# Patient Record
Sex: FEMALE | Race: BLACK OR AFRICAN AMERICAN | URBAN - METROPOLITAN AREA
[De-identification: names, ages, dates, MRNs, and addresses within clinical notes are randomized per-mention and may not be internally consistent; named-entity substitution may affect disease eponyms.]

---

## 2018-03-13 ENCOUNTER — OP HISTORICAL/CONVERTED ENCOUNTER (OUTPATIENT)
Dept: OTHER | Age: 23
End: 2018-03-13

## 2018-04-18 ENCOUNTER — OP HISTORICAL/CONVERTED ENCOUNTER (OUTPATIENT)
Dept: OTHER | Age: 23
End: 2018-04-18

## 2019-06-10 ENCOUNTER — ED HISTORICAL/CONVERTED ENCOUNTER (OUTPATIENT)
Dept: OTHER | Age: 24
End: 2019-06-10

## 2019-08-31 ENCOUNTER — ED HISTORICAL/CONVERTED ENCOUNTER (OUTPATIENT)
Dept: OTHER | Age: 24
End: 2019-08-31

## 2022-05-09 LAB
ANTIBODY SCREEN, EXTERNAL: NEGATIVE
CHLAMYDIA, EXTERNAL: NEGATIVE
HBSAG, EXTERNAL: NEGATIVE
HIV, EXTERNAL: NEGATIVE
N. GONORRHEA, EXTERNAL: NEGATIVE
RPR, EXTERNAL: NORMAL
RUBELLA, EXTERNAL: NORMAL
TYPE, ABO & RH, EXTERNAL: NORMAL

## 2022-11-03 LAB — GRBS, EXTERNAL: POSITIVE

## 2022-11-18 ENCOUNTER — HOSPITAL ENCOUNTER (INPATIENT)
Age: 27
LOS: 5 days | Discharge: HOME | End: 2022-11-23
Attending: OBSTETRICS & GYNECOLOGY | Admitting: OBSTETRICS & GYNECOLOGY
Payer: COMMERCIAL

## 2022-11-18 DIAGNOSIS — Z3A.37 37 WEEKS GESTATION OF PREGNANCY: Primary | ICD-10-CM

## 2022-11-18 PROCEDURE — 65270000029 HC RM PRIVATE

## 2022-11-18 PROCEDURE — 4A1HXCZ MONITORING OF PRODUCTS OF CONCEPTION, CARDIAC RATE, EXTERNAL APPROACH: ICD-10-PCS | Performed by: OBSTETRICS & GYNECOLOGY

## 2022-11-18 PROCEDURE — 3E0DXGC INTRODUCTION OF OTHER THERAPEUTIC SUBSTANCE INTO MOUTH AND PHARYNX, EXTERNAL APPROACH: ICD-10-PCS | Performed by: OBSTETRICS & GYNECOLOGY

## 2022-11-18 PROCEDURE — 74011250637 HC RX REV CODE- 250/637: Performed by: OBSTETRICS & GYNECOLOGY

## 2022-11-18 RX ORDER — VALACYCLOVIR HYDROCHLORIDE 1 G/1
TABLET, FILM COATED ORAL
COMMUNITY
End: 2022-11-23

## 2022-11-18 RX ORDER — ZOLPIDEM TARTRATE 5 MG/1
5 TABLET ORAL
Status: DISCONTINUED | OUTPATIENT
Start: 2022-11-18 | End: 2022-11-20

## 2022-11-18 RX ADMIN — Medication 25 MCG: at 20:59

## 2022-11-18 RX ADMIN — Medication 25 MCG: at 23:45

## 2022-11-19 PROBLEM — O28.3 ABNORMAL ULTRASONIC FINDING ON ANTENATAL SCREENING OF MOTHER: Status: ACTIVE | Noted: 2022-11-19

## 2022-11-19 PROBLEM — Z3A.37 37 WEEKS GESTATION OF PREGNANCY: Status: ACTIVE | Noted: 2022-11-19

## 2022-11-19 LAB
ABO + RH BLD: NORMAL
BLOOD GROUP ANTIBODIES SERPL: NORMAL
ERYTHROCYTE [DISTWIDTH] IN BLOOD BY AUTOMATED COUNT: 17 % (ref 11.5–14.5)
HCT VFR BLD AUTO: 33 % (ref 35–47)
HGB BLD-MCNC: 10.6 G/DL (ref 11.5–16)
MCH RBC QN AUTO: 26.4 PG (ref 26–34)
MCHC RBC AUTO-ENTMCNC: 32.1 G/DL (ref 30–36.5)
MCV RBC AUTO: 82.1 FL (ref 80–99)
NRBC # BLD: 0.02 K/UL (ref 0–0.01)
NRBC BLD-RTO: 0.3 PER 100 WBC
PLATELET # BLD AUTO: 279 K/UL (ref 150–400)
PMV BLD AUTO: 12.8 FL (ref 8.9–12.9)
RBC # BLD AUTO: 4.02 M/UL (ref 3.8–5.2)
SPECIMEN EXP DATE BLD: NORMAL
WBC # BLD AUTO: 7.9 K/UL (ref 3.6–11)

## 2022-11-19 PROCEDURE — 74011000258 HC RX REV CODE- 258: Performed by: OBSTETRICS & GYNECOLOGY

## 2022-11-19 PROCEDURE — 74011250637 HC RX REV CODE- 250/637: Performed by: OBSTETRICS & GYNECOLOGY

## 2022-11-19 PROCEDURE — 65270000029 HC RM PRIVATE

## 2022-11-19 PROCEDURE — 74011250636 HC RX REV CODE- 250/636: Performed by: ADVANCED PRACTICE MIDWIFE

## 2022-11-19 PROCEDURE — 77010026065 HC OXYGEN MINIMUM MEDICAL AIR: Performed by: OBSTETRICS & GYNECOLOGY

## 2022-11-19 PROCEDURE — 36415 COLL VENOUS BLD VENIPUNCTURE: CPT

## 2022-11-19 PROCEDURE — 86900 BLOOD TYPING SEROLOGIC ABO: CPT

## 2022-11-19 PROCEDURE — 59200 INSERT CERVICAL DILATOR: CPT | Performed by: OBSTETRICS & GYNECOLOGY

## 2022-11-19 PROCEDURE — 74011250636 HC RX REV CODE- 250/636: Performed by: OBSTETRICS & GYNECOLOGY

## 2022-11-19 PROCEDURE — 75410000002 HC LABOR FEE PER 1 HR: Performed by: OBSTETRICS & GYNECOLOGY

## 2022-11-19 PROCEDURE — 85027 COMPLETE CBC AUTOMATED: CPT

## 2022-11-19 RX ORDER — MAG HYDROX/ALUMINUM HYD/SIMETH 200-200-20
30 SUSPENSION, ORAL (FINAL DOSE FORM) ORAL
Status: DISCONTINUED | OUTPATIENT
Start: 2022-11-19 | End: 2022-11-20 | Stop reason: HOSPADM

## 2022-11-19 RX ORDER — CALCIUM CARBONATE 200(500)MG
400 TABLET,CHEWABLE ORAL
Status: DISCONTINUED | OUTPATIENT
Start: 2022-11-19 | End: 2022-11-20 | Stop reason: HOSPADM

## 2022-11-19 RX ORDER — LIDOCAINE HYDROCHLORIDE 10 MG/ML
20 INJECTION INFILTRATION; PERINEURAL ONCE
Status: ACTIVE | OUTPATIENT
Start: 2022-11-19 | End: 2022-11-19

## 2022-11-19 RX ORDER — ACETAMINOPHEN 325 MG/1
650 TABLET ORAL
Status: DISCONTINUED | OUTPATIENT
Start: 2022-11-19 | End: 2022-11-20 | Stop reason: HOSPADM

## 2022-11-19 RX ORDER — ONDANSETRON 2 MG/ML
4 INJECTION INTRAMUSCULAR; INTRAVENOUS
Status: DISCONTINUED | OUTPATIENT
Start: 2022-11-19 | End: 2022-11-20 | Stop reason: HOSPADM

## 2022-11-19 RX ORDER — OXYTOCIN/RINGER'S LACTATE 30/500 ML
0-42 PLASTIC BAG, INJECTION (ML) INTRAVENOUS
Status: DISCONTINUED | OUTPATIENT
Start: 2022-11-19 | End: 2022-11-20

## 2022-11-19 RX ORDER — MINERAL OIL
120 OIL (ML) ORAL ONCE
Status: ACTIVE | OUTPATIENT
Start: 2022-11-19 | End: 2022-11-19

## 2022-11-19 RX ORDER — SODIUM CHLORIDE, SODIUM LACTATE, POTASSIUM CHLORIDE, CALCIUM CHLORIDE 600; 310; 30; 20 MG/100ML; MG/100ML; MG/100ML; MG/100ML
125 INJECTION, SOLUTION INTRAVENOUS CONTINUOUS
Status: DISCONTINUED | OUTPATIENT
Start: 2022-11-19 | End: 2022-11-20

## 2022-11-19 RX ORDER — FENTANYL CITRATE 50 UG/ML
100 INJECTION, SOLUTION INTRAMUSCULAR; INTRAVENOUS ONCE
Status: COMPLETED | OUTPATIENT
Start: 2022-11-19 | End: 2022-11-19

## 2022-11-19 RX ADMIN — Medication 25 MCG: at 03:48

## 2022-11-19 RX ADMIN — SODIUM CHLORIDE 5 MILLION UNITS: 900 INJECTION INTRAVENOUS at 02:35

## 2022-11-19 RX ADMIN — SODIUM CHLORIDE 2.5 MILLION UNITS: 9 INJECTION, SOLUTION INTRAVENOUS at 19:40

## 2022-11-19 RX ADMIN — OXYTOCIN 2 MILLI-UNITS/MIN: 10 INJECTION INTRAVENOUS at 11:39

## 2022-11-19 RX ADMIN — SODIUM CHLORIDE 2.5 MILLION UNITS: 9 INJECTION, SOLUTION INTRAVENOUS at 06:21

## 2022-11-19 RX ADMIN — SODIUM CHLORIDE 2.5 MILLION UNITS: 9 INJECTION, SOLUTION INTRAVENOUS at 10:41

## 2022-11-19 RX ADMIN — SODIUM CHLORIDE 2.5 MILLION UNITS: 9 INJECTION, SOLUTION INTRAVENOUS at 15:04

## 2022-11-19 RX ADMIN — OXYTOCIN 16 MILLI-UNITS/MIN: 10 INJECTION INTRAVENOUS at 20:16

## 2022-11-19 RX ADMIN — Medication 25 MCG: at 01:53

## 2022-11-19 RX ADMIN — SODIUM CHLORIDE 2.5 MILLION UNITS: 9 INJECTION, SOLUTION INTRAVENOUS at 23:04

## 2022-11-19 RX ADMIN — FENTANYL CITRATE 100 MCG: 50 INJECTION INTRAMUSCULAR; INTRAVENOUS at 19:47

## 2022-11-19 RX ADMIN — SODIUM CHLORIDE, POTASSIUM CHLORIDE, SODIUM LACTATE AND CALCIUM CHLORIDE 125 ML/HR: 600; 310; 30; 20 INJECTION, SOLUTION INTRAVENOUS at 12:12

## 2022-11-19 NOTE — PROGRESS NOTES
Labor Progress Note  Patient seen, fetal heart rate and contraction pattern evaluated, patient examined. Feeling UC  Patient Vitals for the past 2 hrs:   Height Weight   11/19/22 0957 5' 3\" (1.6 m) 103 kg (227 lb)       Physical Exam:  Cervical Exam:  stallworth easily removed. 6/50/-2 BBOW AROM clear mod amount clear fluid  Uterine Activity: Q2-4  Fetal Heart Rate: Reactive  Baseline: 140 per minute  Variability: moderate  Accelerations: yes  Decelerations: none  S/p PO miso x4 overnight   PCN x several    Assessment/Plan:  IUP 38wks BMI 45 non reassuring fetal surveillance IOL  FWB reassuring, cat 1 tracing  IOL s/p stallworth and PO miso. AROM and start pit in approx 30min.  Epidural prn  Cont PCN  Plan/expectations reviewed, ,Abad Hasten answered    Ernestine Romero MD

## 2022-11-19 NOTE — PROGRESS NOTES
[facility] Labor Progress Note     Patient: Lucila Barber MRN: [id number]  SSN: [id number]    YOB: 1995  Age: 33 y.o. Sex: female        Subjective:   Pt seems mild uncomfortable, grimacing while sitting on the birth ball. Does not desire intervention at this time.        Objective:   Patient Vitals for the past 4 hrs:   Temp Pulse Resp BP SpO2   11/19/22 1453 98.1 °F (36.7 °C) 68 16 121/82 98 %       Cervical Exam: deferred  Membranes:   deferred  Fetal Heart Rate: Reactive  Baseline: 150 beats per minute  Variability: moderate  Accelerations: yes  Decelerations: none  Uterine contractions: regular, every 2-4 minutes      Assessment:   Intrauterine pregnancy at term  Category 1 fetal heart rate tracing         Plan:   Continue current orders/management   Will perform SVE at [place], CNM

## 2022-11-19 NOTE — PROGRESS NOTES
0000- dr. Harlan Leon in to place stallworth pat, pt tolerated procedure well.  60cc placed in balloon

## 2022-11-19 NOTE — PROGRESS NOTES
0700 bedside report given to DEBORAH Perkins RN and GERMAINE Luna Every from [facility]   7261 pt nadya every 3-7 minutes, misoprostol held  0900 spoke with dr arzola regarding pt plan of care. Given orders for pt to eat a regular diet. Will reevaluate plan of care and labor status later this morning. 1044 dr arzola at bedside.  removed stallworth bulb and AROM.

## 2022-11-19 NOTE — PROGRESS NOTES
Labor Progress Note  Patient seen, fetal heart rate and contraction pattern evaluated, patient examined. Patient is having only mild contractions. There were no vitals taken for this visit. Physical Exam:    33 y.o.  in no acute distress. Cervical Exam:   Presentation Vertex  Dilation 1 cms   Effacement 50 %   Station -2  Membranes: Intact  Uterine Activity:   Frequency: Occasional   Duration: 60 seconds   Intensity: Mild  Fetal Heart Rate:    Baseline: 140 bpm   Variability: Moderate   Accelerations: Present (15 x 15 bpm)   Decelerations: None  Category: 1    Procedure: Li balloon placement 60 mls    No results found for this or any previous visit (from the past 12 hour(s)).   Assessment/Plan:  Active Hospital Problems    Diagnosis Date Noted    Abnormal ultrasonic finding on  screening of mother 2022     Priority: 1 - One    37 weeks gestation of pregnancy 2022     Priority: 2 - Two     Abnormal ultrasonic finding on  screening of mother  Abnormal BPP   Continue misoprostol  Li balloon 60 mls placed  Fina Hutton MD  2022

## 2022-11-19 NOTE — H&P
History & Physical    Name: Rohan Dickey MRN: [id number]  SSN: [id number]    YOB: 1995  Age: 33 y.o. Sex: female        Subjective:     Estimated Date of Delivery: None noted. OB History          1    Para        Term                AB        Living             SAB        IAB        Ectopic        Molar        Multiple        Live Births                    Ms. Delon Lakhani is admitted with pregnancy at 37wks 6 ;days for induction of labor. Prenatal course was complicated by  obesity BMI 44, HSV on valtrex and BPP 4/8 today. Last EFW 66% . Please see prenatal records for details. No past medical history on file. No past surgical history on file. Social History     Occupational History    Not on file   Tobacco Use    Smoking status: Not on file    Smokeless tobacco: Not on file   Substance and Sexual Activity    Alcohol use: Not on file    Drug use: Not on file    Sexual activity: Not on file     No family history on file. Not on File  Prior to Admission medications    Not on File        Review of Systems: Pertinent items are noted in HPI. Objective:     Vitals: There were no vitals filed for this visit. Physical Exam:  General NAD  CVS: RRR no r/mg  Pulmonary: CTAB  Abdm: gravid NT  Back: EMILY CVA NT, spine NT  EMILY LE NT w/o edema  [place]: none  Membranes:  Intact  Fetal Heart Rate: Reactive  Baseline: 130 per minute  Variability: moderate  Accelerations: yes  Decelerations: none  SVE: 1/long in office today  EFW: 7 - 7 1/2  On vulvar or vaginal lesions      Prenatal Labs:   No results found for: RUBELLAEXT, GRBSEXT, HBSAGEXT, HIVEXT, RPREXT, GONNOEXT, CHLAMEXT     Assessment/Plan:     Plan: Admit for  IUP 37wks 6 days BMI 44 nonreassuring fetal testing today BPP 4/8. Plan PO miso and cook IOL. No catheters on labor floor, awaiting update if any in house. Place when available, stallworth prn, start PO miso now . Group B Strep was negative. CBC pending.  Plan/expectations /logistics reveiwed, all ques answered.      Signed By:  Tc Middleton MD     November 18, 2022

## 2022-11-20 ENCOUNTER — ANESTHESIA EVENT (OUTPATIENT)
Dept: LABOR AND DELIVERY | Age: 27
End: 2022-11-20
Payer: COMMERCIAL

## 2022-11-20 ENCOUNTER — ANESTHESIA (OUTPATIENT)
Dept: LABOR AND DELIVERY | Age: 27
End: 2022-11-20
Payer: COMMERCIAL

## 2022-11-20 PROCEDURE — 77030005513 HC CATH URETH FOL11 MDII -B

## 2022-11-20 PROCEDURE — 74011250636 HC RX REV CODE- 250/636: Performed by: OBSTETRICS & GYNECOLOGY

## 2022-11-20 PROCEDURE — 76010000391 HC C SECN FIRST 1 HR: Performed by: OBSTETRICS & GYNECOLOGY

## 2022-11-20 PROCEDURE — 74011000250 HC RX REV CODE- 250: Performed by: OBSTETRICS & GYNECOLOGY

## 2022-11-20 PROCEDURE — 74011250637 HC RX REV CODE- 250/637: Performed by: OBSTETRICS & GYNECOLOGY

## 2022-11-20 PROCEDURE — 74011000250 HC RX REV CODE- 250

## 2022-11-20 PROCEDURE — 74011000250 HC RX REV CODE- 250: Performed by: ANESTHESIOLOGY

## 2022-11-20 PROCEDURE — 74011250636 HC RX REV CODE- 250/636: Performed by: NURSE ANESTHETIST, CERTIFIED REGISTERED

## 2022-11-20 PROCEDURE — 74011250636 HC RX REV CODE- 250/636: Performed by: ANESTHESIOLOGY

## 2022-11-20 PROCEDURE — 75410000003 HC RECOV DEL/VAG/CSECN EA 0.5 HR: Performed by: OBSTETRICS & GYNECOLOGY

## 2022-11-20 PROCEDURE — 74011250637 HC RX REV CODE- 250/637: Performed by: ANESTHESIOLOGY

## 2022-11-20 PROCEDURE — 76060000078 HC EPIDURAL ANESTHESIA: Performed by: OBSTETRICS & GYNECOLOGY

## 2022-11-20 PROCEDURE — 65270000029 HC RM PRIVATE

## 2022-11-20 PROCEDURE — 74011000250 HC RX REV CODE- 250: Performed by: NURSE ANESTHETIST, CERTIFIED REGISTERED

## 2022-11-20 PROCEDURE — 74011250636 HC RX REV CODE- 250/636

## 2022-11-20 PROCEDURE — 74011000258 HC RX REV CODE- 258: Performed by: OBSTETRICS & GYNECOLOGY

## 2022-11-20 PROCEDURE — 76010000392 HC C SECN EA ADDL 0.5 HR: Performed by: OBSTETRICS & GYNECOLOGY

## 2022-11-20 PROCEDURE — 77030014125 HC TY EPDRL BBMI -B: Performed by: ANESTHESIOLOGY

## 2022-11-20 RX ORDER — LIDOCAINE HYDROCHLORIDE 10 MG/ML
INJECTION INFILTRATION; PERINEURAL AS NEEDED
Status: DISCONTINUED | OUTPATIENT
Start: 2022-11-20 | End: 2022-11-20 | Stop reason: HOSPADM

## 2022-11-20 RX ORDER — SIMETHICONE 80 MG
80 TABLET,CHEWABLE ORAL
Status: DISCONTINUED | OUTPATIENT
Start: 2022-11-20 | End: 2022-11-23 | Stop reason: HOSPADM

## 2022-11-20 RX ORDER — DOCUSATE SODIUM 100 MG/1
100 CAPSULE, LIQUID FILLED ORAL 2 TIMES DAILY
Status: DISCONTINUED | OUTPATIENT
Start: 2022-11-20 | End: 2022-11-23 | Stop reason: HOSPADM

## 2022-11-20 RX ORDER — PHENYLEPHRINE HCL IN 0.9% NACL 0.4MG/10ML
SYRINGE (ML) INTRAVENOUS AS NEEDED
Status: DISCONTINUED | OUTPATIENT
Start: 2022-11-20 | End: 2022-11-20 | Stop reason: HOSPADM

## 2022-11-20 RX ORDER — BUPIVACAINE HYDROCHLORIDE 2.5 MG/ML
INJECTION, SOLUTION EPIDURAL; INFILTRATION; INTRACAUDAL
Status: SHIPPED | OUTPATIENT
Start: 2022-11-20 | End: 2022-11-20

## 2022-11-20 RX ORDER — OXYTOCIN/RINGER'S LACTATE 30/500 ML
87.3 PLASTIC BAG, INJECTION (ML) INTRAVENOUS AS NEEDED
Status: DISCONTINUED | OUTPATIENT
Start: 2022-11-20 | End: 2022-11-23 | Stop reason: HOSPADM

## 2022-11-20 RX ORDER — OXYTOCIN/RINGER'S LACTATE 30/500 ML
10 PLASTIC BAG, INJECTION (ML) INTRAVENOUS AS NEEDED
Status: DISCONTINUED | OUTPATIENT
Start: 2022-11-20 | End: 2022-11-23 | Stop reason: HOSPADM

## 2022-11-20 RX ORDER — SODIUM CHLORIDE, SODIUM LACTATE, POTASSIUM CHLORIDE, CALCIUM CHLORIDE 600; 310; 30; 20 MG/100ML; MG/100ML; MG/100ML; MG/100ML
125 INJECTION, SOLUTION INTRAVENOUS CONTINUOUS
Status: DISPENSED | OUTPATIENT
Start: 2022-11-20 | End: 2022-11-21

## 2022-11-20 RX ORDER — KETOROLAC TROMETHAMINE 30 MG/ML
30 INJECTION, SOLUTION INTRAMUSCULAR; INTRAVENOUS
Status: ACTIVE | OUTPATIENT
Start: 2022-11-20 | End: 2022-11-21

## 2022-11-20 RX ORDER — PROPOFOL 10 MG/ML
INJECTION, EMULSION INTRAVENOUS AS NEEDED
Status: DISCONTINUED | OUTPATIENT
Start: 2022-11-20 | End: 2022-11-20 | Stop reason: HOSPADM

## 2022-11-20 RX ORDER — SODIUM CHLORIDE, SODIUM LACTATE, POTASSIUM CHLORIDE, CALCIUM CHLORIDE 600; 310; 30; 20 MG/100ML; MG/100ML; MG/100ML; MG/100ML
INJECTION, SOLUTION INTRAVENOUS
Status: DISCONTINUED | OUTPATIENT
Start: 2022-11-20 | End: 2022-11-20 | Stop reason: HOSPADM

## 2022-11-20 RX ORDER — LIDOCAINE HYDROCHLORIDE AND EPINEPHRINE 15; 5 MG/ML; UG/ML
INJECTION, SOLUTION EPIDURAL
Status: SHIPPED | OUTPATIENT
Start: 2022-11-20 | End: 2022-11-20

## 2022-11-20 RX ORDER — MORPHINE SULFATE 0.5 MG/ML
INJECTION, SOLUTION EPIDURAL; INTRATHECAL; INTRAVENOUS AS NEEDED
Status: DISCONTINUED | OUTPATIENT
Start: 2022-11-20 | End: 2022-11-20 | Stop reason: HOSPADM

## 2022-11-20 RX ORDER — FENTANYL/BUPIVACAINE/NS/PF 2-1250MCG
1-16 PREFILLED PUMP RESERVOIR EPIDURAL CONTINUOUS
Status: DISCONTINUED | OUTPATIENT
Start: 2022-11-20 | End: 2022-11-20 | Stop reason: HOSPADM

## 2022-11-20 RX ORDER — ACETAMINOPHEN 325 MG/1
650 TABLET ORAL
Status: DISCONTINUED | OUTPATIENT
Start: 2022-11-20 | End: 2022-11-23 | Stop reason: HOSPADM

## 2022-11-20 RX ORDER — EPHEDRINE SULFATE/0.9% NACL/PF 50 MG/5 ML
10 SYRINGE (ML) INTRAVENOUS ONCE
Status: COMPLETED | OUTPATIENT
Start: 2022-11-20 | End: 2022-11-20

## 2022-11-20 RX ORDER — KETOROLAC TROMETHAMINE 30 MG/ML
30 INJECTION, SOLUTION INTRAMUSCULAR; INTRAVENOUS EVERY 6 HOURS
Status: COMPLETED | OUTPATIENT
Start: 2022-11-20 | End: 2022-11-21

## 2022-11-20 RX ORDER — HYDROCODONE BITARTRATE AND ACETAMINOPHEN 5; 325 MG/1; MG/1
1 TABLET ORAL
Status: DISCONTINUED | OUTPATIENT
Start: 2022-11-20 | End: 2022-11-23 | Stop reason: HOSPADM

## 2022-11-20 RX ORDER — SODIUM CHLORIDE 0.9 % (FLUSH) 0.9 %
5-40 SYRINGE (ML) INJECTION AS NEEDED
Status: DISCONTINUED | OUTPATIENT
Start: 2022-11-20 | End: 2022-11-23

## 2022-11-20 RX ORDER — FENTANYL CITRATE 50 UG/ML
INJECTION, SOLUTION INTRAMUSCULAR; INTRAVENOUS AS NEEDED
Status: DISCONTINUED | OUTPATIENT
Start: 2022-11-20 | End: 2022-11-20 | Stop reason: HOSPADM

## 2022-11-20 RX ORDER — OXYCODONE HYDROCHLORIDE 5 MG/1
10 TABLET ORAL
Status: ACTIVE | OUTPATIENT
Start: 2022-11-20 | End: 2022-11-21

## 2022-11-20 RX ORDER — OXYCODONE HYDROCHLORIDE 5 MG/1
5 TABLET ORAL
Status: DISPENSED | OUTPATIENT
Start: 2022-11-20 | End: 2022-11-21

## 2022-11-20 RX ORDER — ONDANSETRON 2 MG/ML
INJECTION INTRAMUSCULAR; INTRAVENOUS AS NEEDED
Status: DISCONTINUED | OUTPATIENT
Start: 2022-11-20 | End: 2022-11-20 | Stop reason: HOSPADM

## 2022-11-20 RX ORDER — LIDOCAINE HYDROCHLORIDE 20 MG/ML
INJECTION, SOLUTION EPIDURAL; INFILTRATION; INTRACAUDAL; PERINEURAL AS NEEDED
Status: DISCONTINUED | OUTPATIENT
Start: 2022-11-20 | End: 2022-11-20 | Stop reason: HOSPADM

## 2022-11-20 RX ORDER — HYDROMORPHONE HYDROCHLORIDE 1 MG/ML
0.5 INJECTION, SOLUTION INTRAMUSCULAR; INTRAVENOUS; SUBCUTANEOUS
Status: ACTIVE | OUTPATIENT
Start: 2022-11-20 | End: 2022-11-21

## 2022-11-20 RX ORDER — NALBUPHINE HYDROCHLORIDE 10 MG/ML
5 INJECTION, SOLUTION INTRAMUSCULAR; INTRAVENOUS; SUBCUTANEOUS
Status: ACTIVE | OUTPATIENT
Start: 2022-11-20 | End: 2022-11-21

## 2022-11-20 RX ORDER — OXYTOCIN/RINGER'S LACTATE 30/500 ML
87.3 PLASTIC BAG, INJECTION (ML) INTRAVENOUS AS NEEDED
Status: DISCONTINUED | OUTPATIENT
Start: 2022-11-20 | End: 2022-11-20

## 2022-11-20 RX ORDER — DIPHENHYDRAMINE HCL 25 MG
25 CAPSULE ORAL
Status: DISCONTINUED | OUTPATIENT
Start: 2022-11-20 | End: 2022-11-23 | Stop reason: HOSPADM

## 2022-11-20 RX ORDER — TRANEXAMIC ACID 100 MG/ML
INJECTION, SOLUTION INTRAVENOUS AS NEEDED
Status: DISCONTINUED | OUTPATIENT
Start: 2022-11-20 | End: 2022-11-20 | Stop reason: HOSPADM

## 2022-11-20 RX ORDER — OXYTOCIN 10 [USP'U]/ML
INJECTION, SOLUTION INTRAMUSCULAR; INTRAVENOUS AS NEEDED
Status: DISCONTINUED | OUTPATIENT
Start: 2022-11-20 | End: 2022-11-20 | Stop reason: HOSPADM

## 2022-11-20 RX ORDER — SODIUM CHLORIDE 0.9 % (FLUSH) 0.9 %
5-40 SYRINGE (ML) INJECTION AS NEEDED
Status: DISCONTINUED | OUTPATIENT
Start: 2022-11-20 | End: 2022-11-20

## 2022-11-20 RX ORDER — SODIUM CHLORIDE 0.9 % (FLUSH) 0.9 %
5-40 SYRINGE (ML) INJECTION EVERY 8 HOURS
Status: DISCONTINUED | OUTPATIENT
Start: 2022-11-20 | End: 2022-11-23

## 2022-11-20 RX ORDER — LIDOCAINE HYDROCHLORIDE AND EPINEPHRINE 20; 5 MG/ML; UG/ML
INJECTION, SOLUTION EPIDURAL; INFILTRATION; INTRACAUDAL; PERINEURAL AS NEEDED
Status: DISCONTINUED | OUTPATIENT
Start: 2022-11-20 | End: 2022-11-20 | Stop reason: HOSPADM

## 2022-11-20 RX ORDER — ZOLPIDEM TARTRATE 5 MG/1
5 TABLET ORAL
Status: DISCONTINUED | OUTPATIENT
Start: 2022-11-21 | End: 2022-11-23 | Stop reason: HOSPADM

## 2022-11-20 RX ORDER — SODIUM CHLORIDE 0.9 % (FLUSH) 0.9 %
5-40 SYRINGE (ML) INJECTION EVERY 8 HOURS
Status: DISCONTINUED | OUTPATIENT
Start: 2022-11-20 | End: 2022-11-20

## 2022-11-20 RX ORDER — OXYTOCIN/RINGER'S LACTATE 30/500 ML
10 PLASTIC BAG, INJECTION (ML) INTRAVENOUS AS NEEDED
Status: DISCONTINUED | OUTPATIENT
Start: 2022-11-20 | End: 2022-11-20

## 2022-11-20 RX ORDER — NALOXONE HYDROCHLORIDE 0.4 MG/ML
0.4 INJECTION, SOLUTION INTRAMUSCULAR; INTRAVENOUS; SUBCUTANEOUS AS NEEDED
Status: DISCONTINUED | OUTPATIENT
Start: 2022-11-20 | End: 2022-11-23 | Stop reason: HOSPADM

## 2022-11-20 RX ORDER — IBUPROFEN 800 MG/1
800 TABLET ORAL EVERY 8 HOURS
Status: DISCONTINUED | OUTPATIENT
Start: 2022-11-21 | End: 2022-11-23 | Stop reason: HOSPADM

## 2022-11-20 RX ADMIN — LIDOCAINE HYDROCHLORIDE 40 MG: 20 INJECTION, SOLUTION INTRAVENOUS at 09:14

## 2022-11-20 RX ADMIN — Medication 10 MG: at 03:36

## 2022-11-20 RX ADMIN — Medication 80 MCG: at 08:41

## 2022-11-20 RX ADMIN — FENTANYL CITRATE 25 MCG: 50 INJECTION, SOLUTION INTRAMUSCULAR; INTRAVENOUS at 09:14

## 2022-11-20 RX ADMIN — Medication 40 MCG: at 08:48

## 2022-11-20 RX ADMIN — BUPIVACAINE HYDROCHLORIDE 10 ML: 2.5 INJECTION, SOLUTION EPIDURAL; INFILTRATION; INTRACAUDAL; PERINEURAL at 02:39

## 2022-11-20 RX ADMIN — SODIUM CHLORIDE, POTASSIUM CHLORIDE, SODIUM LACTATE AND CALCIUM CHLORIDE 125 ML/HR: 600; 310; 30; 20 INJECTION, SOLUTION INTRAVENOUS at 15:16

## 2022-11-20 RX ADMIN — LIDOCAINE HYDROCHLORIDE,EPINEPHRINE BITARTRATE 5 ML: 20; .005 INJECTION, SOLUTION EPIDURAL; INFILTRATION; INTRACAUDAL; PERINEURAL at 08:28

## 2022-11-20 RX ADMIN — SODIUM CHLORIDE, PRESERVATIVE FREE 10 ML: 5 INJECTION INTRAVENOUS at 22:48

## 2022-11-20 RX ADMIN — Medication 80 MCG: at 08:43

## 2022-11-20 RX ADMIN — DOCUSATE SODIUM 100 MG: 100 CAPSULE, LIQUID FILLED ORAL at 18:09

## 2022-11-20 RX ADMIN — CEFAZOLIN SODIUM 2 G: 1 POWDER, FOR SOLUTION INTRAMUSCULAR; INTRAVENOUS at 08:46

## 2022-11-20 RX ADMIN — Medication 40 MCG: at 08:40

## 2022-11-20 RX ADMIN — KETOROLAC TROMETHAMINE 30 MG: 30 INJECTION, SOLUTION INTRAMUSCULAR at 16:34

## 2022-11-20 RX ADMIN — OXYTOCIN 40 UNITS: 10 INJECTION, SOLUTION INTRAMUSCULAR; INTRAVENOUS at 09:11

## 2022-11-20 RX ADMIN — LIDOCAINE HYDROCHLORIDE,EPINEPHRINE BITARTRATE 5 ML: 20; .005 INJECTION, SOLUTION EPIDURAL; INFILTRATION; INTRACAUDAL; PERINEURAL at 08:33

## 2022-11-20 RX ADMIN — SODIUM CHLORIDE 2.5 MILLION UNITS: 9 INJECTION, SOLUTION INTRAVENOUS at 03:36

## 2022-11-20 RX ADMIN — Medication 80 MCG: at 08:42

## 2022-11-20 RX ADMIN — LIDOCAINE HYDROCHLORIDE,EPINEPHRINE BITARTRATE 5 ML: 20; .005 INJECTION, SOLUTION EPIDURAL; INFILTRATION; INTRACAUDAL; PERINEURAL at 08:41

## 2022-11-20 RX ADMIN — OXYCODONE 5 MG: 5 TABLET ORAL at 11:49

## 2022-11-20 RX ADMIN — SODIUM CHLORIDE, POTASSIUM CHLORIDE, SODIUM LACTATE AND CALCIUM CHLORIDE: 600; 310; 30; 20 INJECTION, SOLUTION INTRAVENOUS at 09:12

## 2022-11-20 RX ADMIN — SODIUM CHLORIDE, POTASSIUM CHLORIDE, SODIUM LACTATE AND CALCIUM CHLORIDE 500 ML: 600; 310; 30; 20 INJECTION, SOLUTION INTRAVENOUS at 03:31

## 2022-11-20 RX ADMIN — PROPOFOL 20 MG: 10 INJECTION, EMULSION INTRAVENOUS at 09:14

## 2022-11-20 RX ADMIN — Medication 12 ML/HR: at 03:08

## 2022-11-20 RX ADMIN — FENTANYL CITRATE 25 MCG: 50 INJECTION, SOLUTION INTRAMUSCULAR; INTRAVENOUS at 09:19

## 2022-11-20 RX ADMIN — AZITHROMYCIN DIHYDRATE 500 MG: 500 INJECTION, POWDER, LYOPHILIZED, FOR SOLUTION INTRAVENOUS at 08:46

## 2022-11-20 RX ADMIN — Medication 80 MCG: at 08:52

## 2022-11-20 RX ADMIN — PHENYLEPHRINE HYDROCHLORIDE 5 MCG/MIN: 10 INJECTION INTRAVENOUS at 08:53

## 2022-11-20 RX ADMIN — Medication 80 MCG: at 08:46

## 2022-11-20 RX ADMIN — LIDOCAINE HYDROCHLORIDE 3 MG: 10 INJECTION, SOLUTION INFILTRATION; PERINEURAL at 02:30

## 2022-11-20 RX ADMIN — MORPHINE SULFATE 3 MG: 0.5 INJECTION, SOLUTION EPIDURAL; INTRATHECAL; INTRAVENOUS at 09:17

## 2022-11-20 RX ADMIN — TRANEXAMIC ACID 1 G: 100 INJECTION, SOLUTION INTRAVENOUS at 09:11

## 2022-11-20 RX ADMIN — PROPOFOL 20 MG: 10 INJECTION, EMULSION INTRAVENOUS at 09:18

## 2022-11-20 RX ADMIN — ONDANSETRON HYDROCHLORIDE 4 MG: 2 SOLUTION INTRAMUSCULAR; INTRAVENOUS at 08:39

## 2022-11-20 RX ADMIN — KETOROLAC TROMETHAMINE 30 MG: 30 INJECTION, SOLUTION INTRAMUSCULAR at 22:47

## 2022-11-20 RX ADMIN — SODIUM CHLORIDE, POTASSIUM CHLORIDE, SODIUM LACTATE AND CALCIUM CHLORIDE 125 ML/HR: 600; 310; 30; 20 INJECTION, SOLUTION INTRAVENOUS at 10:00

## 2022-11-20 RX ADMIN — SIMETHICONE 80 MG: 80 TABLET, CHEWABLE ORAL at 11:49

## 2022-11-20 RX ADMIN — SODIUM CHLORIDE, POTASSIUM CHLORIDE, SODIUM LACTATE AND CALCIUM CHLORIDE: 600; 310; 30; 20 INJECTION, SOLUTION INTRAVENOUS at 08:56

## 2022-11-20 RX ADMIN — SODIUM CHLORIDE, POTASSIUM CHLORIDE, SODIUM LACTATE AND CALCIUM CHLORIDE 125 ML/HR: 600; 310; 30; 20 INJECTION, SOLUTION INTRAVENOUS at 23:32

## 2022-11-20 RX ADMIN — KETOROLAC TROMETHAMINE 30 MG: 30 INJECTION, SOLUTION INTRAMUSCULAR at 10:50

## 2022-11-20 RX ADMIN — LIDOCAINE HYDROCHLORIDE AND EPINEPHRINE 3.5 ML: 15; 5 INJECTION, SOLUTION EPIDURAL at 02:37

## 2022-11-20 NOTE — PROGRESS NOTES
: Bedside and Verbal shift change report given to Wiliam Lombard RN (oncoming nurse) by Geraldo Curran RN (offgoing nurse). Report included the following information SBAR, Kardex, Intake/Output, MAR, and Recent Results. : SVE by Sedrick Mistry. /-1.     : Pt up on birthing ball. : Pt back in bed.     : Pt encouraged to walk in the room, bounce on birthing ball, or make frequent position changes in bed.     : Pt on L side with peanut ball between legs. 230: Gerldine Laity from [facility] to half pitocin. 2313: SVE by [facility]. .     2315: IUPC placed at this time. 2334: Pt placed on L side with peanut ball between legs. 0033: Pt placed in thrones position. 0120: Pt placed on L side with R leg in stirrup, trendelenburg. 0150: [facility] [place]. No change. Bolusing for epidural now. 0230: Dr. Wiliam Marquez in room for epidural placement. This RN attempting to continuously monitor FHR.     0239: Test dose given. 8499: Pt placed in trendelenburg. 0448: Pt turned on L side. 5062: [facility] [place]. No change. Pt turned on R side. 2598:  discussed with pt at this time. 0700: Bedside and Verbal shift change report given to Davin Becker RN (oncoming nurse) by Wiliam Lombard RN (offgoing nurse). Report included the following information SBAR, Kardex, Intake/Output, MAR, and Recent Results.

## 2022-11-20 NOTE — DISCHARGE SUMMARY
Patient ID:  Yasmani Robbins  [id number]  89 y.o.  1995    Admit Date: 2022    Discharge Date: 2022    Admitting Physician: Sj Caballero MD    Attending Physician: Stephanie Taylor MD    Admission Diagnoses: Abnormal ultrasonic finding on  screening of mother [O28.3]    Procedures for this admission: Procedure(s):   SECTION    Discharge Diagnoses: Same as above with 1LTCS producing a viable infant. Information for the patient's :  Martha Parekh Plasgarret [[id number]]        Apgars 8 and 9    Discharge Disposition:  good    Discharge Condition:  good    Additional Diagnoses:  IUP 38wks BMI 45 nonreassuring fetal surveillance, stage  1 arrest .     Maternal Labs:   Lab Results   Component Value Date/Time    HBsAg, External Negative 2022 12:00 AM    HIV, External Negative 2022 12:00 AM    Rubella, External Immune 2022 12:00 AM    RPR, External Non-reactive 2022 12:00 AM    GrBStrep, External positive 2022 12:00 AM       Cord Blood Results:   Information for the patient's :  Martha Parekh [[id number]]   No results found for: PCTABR, PCTDIG, BILI, ABORH         History of Present Illness:   OB History          1    Para        Term                AB        Living             SAB        IAB        Ectopic        Molar        Multiple        Live Births                  Admitted for induction of labor. Hospital Course:   Patient was admitted as above and delivered via 1LTCS . Please the chart for details. The postpartum course was unremarkable. She was deemed stable for discharge home on day 3.     Follow-up Care: 1mo        Signed:  Mirella Benson MD  2022  9:50 AM

## 2022-11-20 NOTE — PROGRESS NOTES
1926 Bedside and Verbal shift change report given to [place], RN (oncoming nurse) by Roosevelt Oconnor RN (offgoing nurse). Report included the following information SBAR, Kardex, and MAR.

## 2022-11-20 NOTE — PROGRESS NOTES
Labor Progress Note late entry from 0755  Patient seen, fetal heart rate and contraction pattern evaluated, patient examined. Seen w/ CNM  Patient Vitals for the past 2 hrs:   BP Temp Pulse Resp SpO2   11/20/22 0816 (!) 95/53 -- (!) 104 -- --   11/20/22 0814 -- -- -- -- 100 %   11/20/22 0813 (!) 101/57 -- (!) 101 -- --   11/20/22 0811 100/61 -- (!) 110 -- --   11/20/22 0809 -- -- -- -- 97 %   11/20/22 0807 (!) 101/57 -- (!) 110 -- --   11/20/22 0804 (!) 106/56 -- (!) 110 -- 100 %   11/20/22 0801 110/62 -- (!) 107 -- --   11/20/22 0759 -- -- -- -- 100 %   11/20/22 0758 (!) 100/49 -- (!) 112 -- --   11/20/22 0755 108/62 -- 98 -- --   11/20/22 0754 -- -- -- -- 99 %   11/20/22 0752 (!) 111/55 -- (!) 104 -- --   11/20/22 0750 106/71 -- (!) 110 -- --   11/20/22 0749 -- -- -- -- 98 %   11/20/22 0746 (!) 104/57 -- (!) 112 -- --   11/20/22 0744 -- -- -- -- 99 %   11/20/22 0743 (!) 92/45 -- (!) 104 -- --   11/20/22 0740 114/61 -- (!) 105 -- --   11/20/22 0739 -- -- -- -- 100 %   11/20/22 0737 111/64 -- (!) 102 -- --   11/20/22 0734 102/60 -- (!) 120 -- 100 %   11/20/22 0728 113/64 -- (!) 109 -- --   11/20/22 0725 96/72 -- -- -- --   11/20/22 0724 -- -- -- -- 97 %   11/20/22 0722 110/60 -- (!) 116 -- --   11/20/22 0719 117/62 -- (!) 121 -- 99 %   11/20/22 0716 112/63 -- (!) 122 -- --   11/20/22 0711 (!) 119/56 98.2 °F (36.8 °C) (!) 105 16 100 %       Physical Exam:  Cervical Exam:  def, unchanged overnight  Uterine Activity: Q8-10  Fetal Heart Rate: Reactive  Baseline: 140 per minute  Variability: moderate  Accelerations: yes  Decelerations: none  Pit off, s/p PCN x 7    Discussed day and overnight events with CNM. Essentially no cervical change, s/p pit, IUPC and increased pit to adequate UC, s/p epidural. No change at last SVE.  Essentially no change since AROM >18 hours ago    Assessment/Plan:  IUP 38wks nonreassuring fetal surveillance IOL BMI 45  stage 1 arrest  FWB reassuring, cat 1 tracing  Gbs treated  Stage  1 arrest and recommend 1LTCS for delivery. Patient seen w/ nursing staff and CNM. IOL indications, events and care reviewed. Recommendations reviewed. PARQ held re: 1LTCS, risks/benefit reviewed, including but not limited to, bleeding, infection, damage to internal/ surrounding organs. Risk of thrombosis/ blood transfusion reviewed. All ques answered, consent obtained. Anes updated. Ancef and carmelina HATHAWAY.      Raissa Chino MD

## 2022-11-20 NOTE — PROGRESS NOTES
Pt comfortable and on her phone. I informed her that a c/s that would be the safest method of delivery at this point. R/B/A alternatives reviewed. [address] is very understanding and agrees with POC.     Gloria Ross CNM

## 2022-11-20 NOTE — PROGRESS NOTES
[facility] Labor Progress Note     Patient: Zoya Simpson MRN: [id number]  SSN: [id number]    YOB: 1995  Age: 33 y.o. Sex: female        Subjective:   Patient resting comfortably after epidural placement. Objective:   Patient Vitals for the past 4 hrs:   Temp Pulse BP SpO2   11/20/22 0358 -- 100 (!) 97/56 --   11/20/22 0355 -- (!) 110 (!) 90/48 --   11/20/22 0352 -- 98 103/60 --   11/20/22 0349 -- (!) 25 (!) 99/56 --   11/20/22 0346 98.3 °F (36.8 °C) (!) 24 106/61 --   11/20/22 0343 -- 93 111/60 --   11/20/22 0340 -- -- 119/67 --   11/20/22 0337 -- 81 (!) 101/51 --   11/20/22 0334 -- 82 (!) 95/51 99 %   11/20/22 0331 -- 81 (!) 92/59 --   11/20/22 0328 -- 78 (!) 100/56 --   11/20/22 0325 -- 91 91/63 --   11/20/22 0323 -- 92 108/61 --   11/20/22 0319 -- 73 120/70 99 %   11/20/22 0316 -- 69 120/67 --   11/20/22 0313 -- 93 106/71 --   11/20/22 0310 -- (!) 101 118/75 --   11/20/22 0307 -- (!) 105 114/71 --   11/20/22 0304 -- 94 116/70 100 %   11/20/22 0301 -- 83 117/70 --   11/20/22 0258 -- 83 122/70 --   11/20/22 0255 -- 82 125/76 --   11/20/22 0252 -- (!) 105 118/70 --   11/20/22 0249 -- 96 121/68 98 %   11/20/22 0246 -- 90 125/73 --   11/20/22 0243 -- (!) 109 125/80 --   11/20/22 0240 -- (!) 130 125/77 --   11/20/22 0237 -- (!) 128 (!) 137/92 --   11/20/22 0234 -- (!) 133 (!) 142/84 100 %   11/20/22 0231 -- (!) 122 (!) 149/92 --       Cervical Exam: deferred  Membranes:  Artificial Rupture of Membranes;  Amniotic Fluid: small amount of clear fluid  Fetal Heart Rate: Reactive  Baseline: 140 beats per minute  Variability: moderate  Accelerations: yes  Decelerations: late, repetitive  Uterine contractions: regular, every 4 minutes  MVUs: 110      Assessment:   Intrauterine pregnancy at term  Category 2 fetal heart rate tracing   Hypotensive after epidural placement  Inadequate MVUs        Plan:   ALBERTO Kauffman halved pitocin and 500cc bolus was given; pitocin now at 8 mu/min  Continue current orders/management   CNM management   Anticipate     Nicole Dudley CNM

## 2022-11-20 NOTE — ANESTHESIA POSTPROCEDURE EVALUATION
Procedure(s):   SECTION. epidural    Anesthesia Post Evaluation      Multimodal analgesia: multimodal analgesia not used between 6 hours prior to anesthesia start to PACU discharge  Patient location during evaluation: PACU  Patient participation: complete - patient participated  Level of consciousness: awake  Pain management: adequate  Airway patency: patent  Anesthetic complications: no  Cardiovascular status: acceptable, blood pressure returned to baseline and hemodynamically stable  Respiratory status: acceptable  Hydration status: acceptable  Post anesthesia nausea and vomiting:  controlled      INITIAL Post-op Vital signs:   Vitals Value Taken Time   /92 22 1018   Temp     Pulse 91 22 1018   Resp     SpO2 100 % 22 1021   Vitals shown include unvalidated device data.

## 2022-11-20 NOTE — ANESTHESIA PREPROCEDURE EVALUATION
Relevant Problems   No relevant active problems     Past Medical History:   Diagnosis Date    Herpes simplex virus (HSV) infection      Anesthesia Evaluation    Physical Exam    Airway  Mallampati: II  TM Distance: 4 - 6 cm  Neck ROM: normal range of motion   Mouth opening: Normal     Cardiovascular    Rhythm: regular           Dental  No notable dental hx       Pulmonary  Breath sounds clear to auscultation               Abdominal         Other Findings            Anesthetic Plan    ASA: 2  Anesthesia type: epidural            Anesthetic plan and risks discussed with: Patient

## 2022-11-20 NOTE — DISCHARGE INSTRUCTIONS
Discharge Instructions for  Section    Patient ID:  Chayo Garcia  [id number]  33 y.o.  1995    Take Home Medications       Continue taking your prenatal vitamins if you are breastfeeding. Follow-up care is a key part of your treatment and safety. Be sure to keep all your scheduled appointments    Activity  Avoid heavy lifting greater than 10lbs for 2 weeks after your surgery  Pelvic rest for 6 weeks, ie, nothing in your vagina for 6 weeks  (no intercourse, tampons, or douching). No tubs for 6 weeks. No driving for 2 weeks and until you are no longer taking prescription pain medications and you can put your foot on the break in a hurry    Limit climbing stairs to only when necessary the first 1-2 weeks. Hold the railing and do not carry your baby up and downstairs at first for safety   You may walk as tolerated, though be care to not over-do it. Walking will assist in overall healing, decrease constipation and bloating. Elda Gibbs feel better more quickly with some daily movement. Diet  Regular diet as tolerated    Wound care  There are several types of incision closures. If you have metal staples in place when you leave the hospital, please call your doctors office to schedule the staple removal as directed at discharge. If you have steri strips covering your incision, you may remove them as they fall off or be sure to remove them about one week after your surgery. Removing them in the shower may be easier. If you have Dermabond, or skin glue, covering your incision, it will fall off slowly in the next few weeks. You may remove it as it begins to peel off. Additional wound care  Clear or reddish drainage from your incision is normal.  It's best to leave it open to the air, but if there is drainage, you may cover the incision lightly with gauze, preferably without tape. Keep the incision clean and dry.     Numbness of the skin at or around your incision is normal and the feeling usually returns gradually. Call your doctor if you have increasing drainage from your incision, an unpleasant odor, red streaks, an increase in pain, or if it appears to open. Pain Management  Continue prescription pain medication as written by discharging physician  Over the counter medications such as Tylenol and ibuprofen (Motrin or Advil) are also ideal.  These may be taken together or in alternating doses. You may  take the maximum dose:  Motrin or Advil (generic ibuprofen), either 3 tablets every 6 hours or 4 tablets every 8 hours. Your prescription medication conntains a narcotic mixed with Tylenol,so  you should not take any extra Tylenol or acetaminophen until you have reduced your prescription pain medication. The maximum dose is Tylenol or acetominophen 1000 mg every 6 hours (equivalent to 2 Extra Strength Tylenols every 6 hours). After a few days, begin to replace the prescription medication with over the counter medications. Use the prescription medication if needed for more severe pain or at night. The prescription medication can be addictive if overused. Add heating pad or sitz baths as needed. Constipation  Constipation is normal after surgery, especially while taking prescription narcotic pain medication. Over the counter remedies including ducosate (Colace), take 1-2 capsules 1-2 times daily for soft stool as needed. You may also add/ try milk of magnesia or rectal remedies such as Dulcolax or Fleets enema. Recovery: What to Expect at Home  Fatigue is expected. Try to rest when you can and don't worry about doing housework or other tasks which can wait. The soreness in your incision will improve significantly over the first 2 weeks, but it may take 6-8 weeks before you are completely recovered. Back pain or general body aches or muscle soreness are expected and should improve with acetominophen or ibuprofen.   Leg swelling due to pregnancy and/or IV fluids given in the hospital will take about two weeks to resolve. Most women experience some form of the \"Baby Blues\" after having a baby. Feeling emotional, tearful, frustrated, anxious, sad, and irritable some of the time is normal and go away after about 2 weeks. Adequate rest and help from your family will help. Take breaks from caring for the baby. Call your doctor if your symptoms seem severe, last more than 2 weeks, or seem to be getting worse instead of better. Get help immediately if you have thoughts of wanting to hurt yourself or others! Call your doctor or seek immediate medical care if you have:  Heavy vaginal bleeding, soaking through one or more pads an hour for several hours. Foul-smelling discharge from your vagina or incision. Consistent nausea and vomiting and cannot keep fluids down. Consistent pain that does not get better after you take pain medicine.   Sudden chest pain and shortness of breath  Signs of a blood clot: pain/ swelling/ increasing redness in your lower extremeties  Signs of infection: increased pain in your abdomen or vaginal area; red streaks, warmth, or tenderness of your breasts; fever of 100.5 F or greater

## 2022-11-20 NOTE — PROGRESS NOTES
[facility] Labor Progress Note     Patient: Yaneli Mosquera MRN: [id number]  SSN: [id number]    YOB: 1995  Age: 33 y.o. Sex: female        Subjective:   Patient appears more uncomfortable with contractions, and is very tired. Agreeable to cervical check. Objective:   Patient Vitals for the past 4 hrs:   Pulse BP SpO2   22 -- -- 100 %   22 -- -- 100 %   22 -- -- 99 %   22 -- -- 100 %   22 -- -- 99 %   22 -- -- 100 %   22 -- -- 99 %   22 -- -- 99 %   22 -- -- 99 %   22 -- -- 99 %   22 -- -- 100 %   22 -- -- 100 %   22 -- -- 99 %   22 -- -- 100 %   22 -- -- 99 %   22 1753 62 124/78 --   22 -- -- 99 %   22 174 -- -- 99 %       Cervical Exam: 6 cm dilated    80% effaced    -1 station    Presenting Part: cephalic  Cervical Position: mid position  Consistency: Soft  Membranes:  Artificial Rupture of Membranes;  Amniotic Fluid: small amount of clear fluid  Fetal Heart Rate: Reactive  Baseline: 140 beats per minute  Variability: moderate  Accelerations: yes  Decelerations: none  Uterine contractions: regular, every 2 minutes      Assessment:   Intrauterine pregnancy at term  Category 1 fetal heart rate tracing         Plan:   Continue current orders/management   IV pain medicine ordered  [facility] management   Anticipate     Shanice Rodriguez CNM

## 2022-11-20 NOTE — PROGRESS NOTES
[facility] Labor Progress Note     Patient: Marlene Horn MRN: [id number]  SSN: [id number]    YOB: 1995  Age: 33 y.o.   Sex: female        Subjective:   Pt comfortable after epidural       Objective:   Patient Vitals for the past 4 hrs:   Temp Pulse BP SpO2   11/20/22 0555 -- (!) 104 (!) 107/58 --   11/20/22 0549 -- (!) 103 107/61 98 %   11/20/22 0546 -- 98 (!) 106/57 --   11/20/22 0543 -- 94 (!) 106/57 --   11/20/22 0540 -- 98 (!) 104/57 --   11/20/22 0537 -- 100 110/60 --   11/20/22 0534 -- 93 108/62 100 %   11/20/22 0531 -- 98 107/62 --   11/20/22 0428 -- (!) 101 (!) 94/52 --   11/20/22 0425 -- 98 (!) 97/57 --   11/20/22 0422 -- 100 (!) 100/54 --   11/20/22 0419 -- 97 (!) 97/54 100 %   11/20/22 0416 -- 97 (!) 99/55 --   11/20/22 0413 -- (!) 101 (!) 97/57 --   11/20/22 0410 -- 88 (!) 105/57 --   11/20/22 0407 -- 97 (!) 100/55 --   11/20/22 0404 -- 90 111/62 95 %   11/20/22 0401 -- 100 (!) 105/55 --   11/20/22 0358 -- 100 (!) 97/56 --   11/20/22 0355 -- (!) 110 (!) 90/48 --   11/20/22 0352 -- 98 103/60 --   11/20/22 0349 -- (!) 25 (!) 99/56 --   11/20/22 0346 98.3 °F (36.8 °C) (!) 24 106/61 --   11/20/22 0343 -- 93 111/60 --   11/20/22 0340 -- -- 119/67 --   11/20/22 0337 -- 81 (!) 101/51 --   11/20/22 0334 -- 82 (!) 95/51 99 %   11/20/22 0331 -- 81 (!) 92/59 --   11/20/22 0328 -- 78 (!) 100/56 --   11/20/22 0325 -- 91 91/63 --   11/20/22 0323 -- 92 108/61 --   11/20/22 0319 -- 73 120/70 99 %   11/20/22 0316 -- 69 120/67 --   11/20/22 0313 -- 93 106/71 --   11/20/22 0310 -- (!) 101 118/75 --   11/20/22 0307 -- (!) 105 114/71 --   11/20/22 0304 -- 94 116/70 100 %   11/20/22 0301 -- 83 117/70 --   11/20/22 0258 -- 83 122/70 --   11/20/22 0255 -- 82 125/76 --   11/20/22 0252 -- (!) 105 118/70 --   11/20/22 0249 -- 96 121/68 98 %   11/20/22 0246 -- 90 125/73 --   11/20/22 0243 -- (!) 109 125/80 --   11/20/22 0240 -- (!) 130 125/77 --       Cervical Exam: no change  Membranes:  Artificial Rupture of Membranes; Amniotic Fluid: small amount of clear fluid  Fetal Heart Rate: Reactive  Baseline: 150 beats per minute  Variability: moderate  Accelerations: yes  Decelerations: late, recurrent; adryan to 120  Uterine contractions: regular, every 4-5 minutes  MVUs: 90      Assessment:   Intrauterine pregnancy at term  Category 2 fetal heart rate tracing         Plan:   Discussed likelihood of delivery by c/s due to absent/minimal cervical change, and the inability to achieve adequate contractions  Plan to call Dr. Minda Goodman with report  Continue current orders/management   CNM management   Anticipate     Nicole Dudley CNM

## 2022-11-20 NOTE — L&D DELIVERY NOTE
Operative Note    Name: Rohan Dickey   Medical Record Number: [id number]   YOB: 1995  Today's Date: 2022      Pre-operative Diagnosis: IUP 38wks, BMI 45, nonreassuring fetal surveillance, stage 1 arrest    Post-operative Diagnosis: same, delivered    Procedure: same    Surgeon(s):  Niya Hubbard MD    Assist:  Dana Miranda RN    Anesthesia: epidural     QBL 610cc    Prophylactic Antibiotics: azithro and  Ancef  DVT Prophylaxis: Sequential Compression Devices         Fetal Description: saucedo     Birth Information:   Information for the patient's :  Jernigan Kar, Martha James [[id number]]   Delivery of a 2.915 kg male infant on 2022 at 9:09 AM. Apgars were 8  and 9 . Umbilical Cord: 3 Vessels     Umbilical Cord Events: None     Placenta: Manual Removal removal with Normal appearance. Amniotic Fluid Volume: Moderate     Amniotic Fluid Description:  Clear      Umbilical Cord: 3 vessels present    Placenta:  manual removal    Additional intraoperative findings: viable male, not engaged in pelvis.  Normal adnexa and uterus    Specimens: none           Complications: none    Implants: none    Stable to PACU    Ebonie Aranda MD  2022  9:55 AM

## 2022-11-20 NOTE — PROGRESS NOTES
9213 Assumed care of patient. Received awake in bed with family at bedside. Denies pain/discomforts. Feels comfortable with epidural. Denies H/A, visual disturbances, N/V, RUQ pain. Il patient draining rylie colored urine. Opportunity provided to ask questions. Call schroeder in reach. [phone]  to bedside to discuss POC, pt consents to . 8196 Arrived to OR via bed transport at this time. 0945 Returned to room at this time via stretcher. Awake and alert, denies pain. 1200 QBL 760ml. 1405 TRANSFER - OUT REPORT:    Verbal report given to [facility] Tolu RN(name) on Garry Griffiths  being transferred to MIU(unit) for routine progression of care       Report consisted of patients Situation, Background, Assessment and   Recommendations(SBAR). Information from the following report(s) SBAR, Kardex, MAR, and Recent Results was reviewed with the receiving nurse. Lines:   Peripheral IV 22 Anterior;Proximal;Right Forearm (Active)   Site Assessment Clean, dry, & intact 22 1003   Phlebitis Assessment 0 22 1003   Infiltration Assessment 0 22 1003   Dressing Status Clean, dry, & intact 22 1003   Dressing Type Transparent;Tape 22 1003   Hub Color/Line Status Pink;Patent; Infusing 22 1003   Action Taken Open ports on tubing capped 22 1003   Alcohol Cap Used Yes 22 0316        Opportunity for questions and clarification was provided.       Patient transported with:   Registered Nurse

## 2022-11-20 NOTE — PROGRESS NOTES
[facility] Labor Progress Note     Patient: Alexis Romero MRN: [id number]  SSN: [id number]    YOB: 1995  Age: 33 y.o. Sex: female        Subjective:   Pt extremely uncomfortable, agreeable to epidural at this time. Objective:   Patient Vitals for the past 4 hrs:  22 2348 98.4 °F (36.9 °C) -- -- --   22 2253 -- 68 135/81 --             Cervical Exam: No change from previous exam  Membranes:  Artificial Rupture of Membranes;  Amniotic Fluid: small amount of clear fluid  Fetal Heart Rate: Reactive  Baseline: 135 beats per minute  Variability: moderate  Accelerations: yes  Decelerations: none  Uterine contractions: regular, every 3-4 minutes  MVUs; 205 at 0105      Assessment:   Intrauterine pregnancy at term  Category 1 fetal heart rate tracing         Plan:   Adequate MVUs achieved  Continue current orders/management   Anesthesia notified, patient to get epidural  CNM management   Anticipate     Victor Hugo Ballard CNM

## 2022-11-20 NOTE — ANESTHESIA PROCEDURE NOTES
Epidural Block    Patient location during procedure: OB  Start time: 11/20/2022 2:30 AM  End time: 11/20/2022 2:42 AM  Reason for block: labor epidural  Staffing  Performed: attending   Anesthesiologist: Tia Manzo MD  Preanesthetic Checklist  Completed: patient identified, IV checked, site marked, risks and benefits discussed, surgical consent, monitors and equipment checked, pre-op evaluation, timeout performed and fire risk safety assessment completed and verbalized  Block Placement  Patient position: sitting  Prep: alcohol swabs, Betadine and DuraPrep  Sterility prep: drape, gloves and mask  Sedation level: no sedation  Approach: midline  Location: lumbar  Lumbar location: L2-L3  Epidural  Loss of resistance technique: hanging drop and saline  Guidance: landmark technique  Needle  Needle type: Tuohy   Needle gauge: 17 G  Needle length: 10 cm  Needle insertion depth: 9 cm  Catheter type: end hole  Catheter size: 20 G  Catheter at skin depth: 14 cm  Catheter securement method: surgical tape  Test dose: negative  Medications Administered  lidocaine-EPINEPHrine (XYLOCAINE) 1.5 %-1:200,000 Epidural - Epidural   3.5 mL - 11/20/2022 2:37:00 AM  bupivacaine (PF) (MARCAINE) 0.25% Epidural - Epidural   10 mL - 11/20/2022 2:39:00 AM  Assessment  Number of attempts: 2  Procedure assessment: patient tolerated procedure well with no immediate complications  Additional Notes  Bony landmarks hardly palpable; aseptic, atraumatic technique

## 2022-11-20 NOTE — PROGRESS NOTES
[facility] Labor Progress Note     Patient: Vertis Oppenheim MRN: [id number]  SSN: [id number]    YOB: 1995  Age: 33 y.o. Sex: female        Subjective:   Pt very uncomfortable reporting significant rectal pressure. Objective:   Patient Vitals for the past 4 hrs:  22 2348 98.4 °F (36.9 °C) -- -- --   22 2253 -- 68 135/81 --   22 2206 98.1 °F (36.7 °C) -- -- --            Cervical Exam: /-1  Membranes:  Artificial Rupture of Membranes;  Amniotic Fluid: small amount of clear fluid  Fetal Heart Rate: Reactive  Baseline: 140 beats per minute  Variability: moderate  Accelerations: yes  Decelerations: none  Uterine contractions: regular, every 4-5 minutes, but appear visually inadequate      Assessment:   Intrauterine pregnancy at term  Category 1 fetal heart rate tracing         Plan:   Pitocin halved and new bag hung  IUPC placed to better assess adequacy of uterine contractions, R/B/A explained to patient  Suggested epidural placement to possibly aid in cervical relaxation and dilation, will consider  Continue current orders/management   CNM management   Anticipate HEMAL Barker CNM

## 2022-11-20 NOTE — LACTATION NOTE
This note was copied from a baby's chart. This is mother's first baby. Mother states baby has been latching on well. [facility] taught mother how to hand express 10-20 drops if baby does not breastfeed. Skin to skin encouraged. Discussed with mother her plan for feeding. Reviewed the benefits of exclusive breast milk feeding during the hospital stay. Informed her of the risks of using formula to supplement in the first few days of life as well as the benefits of successful breast milk feeding; referred her to the Breastfeeding booklet about this information. She acknowledges understanding of information reviewed and states that it is her plan to breastfeed her infant. Will support her choice and offer additional information as needed. Encouraged mom to attempt feeding with baby led feeding cues. Just as sucking on fingers, rooting, mouthing. Looking for 8-12 feedings in 24 hours. Don't limit baby at breast, allow baby to come of breast on it's own. Baby may want to feed  often and may increase number of feedings on second day of life. Skin to skin encouraged. If baby doesn't nurse,  Mom should  hand express  10-20 drops of colostrum and drip into baby's mouth, or give to baby by finger feeding, cup feeding, or spoon feeding at least every 2-3 hours. Mother will successfully establish breastfeeding by feeding in response to early feeding cues   or wake every 3h, will obtain deep latch, and will keep log of feedings/output. Taught to BF at hunger cues and or q 2-3 hrs and to offer 10-20 drops of hand expressed colostrum at any non-feeds.       Breast Assessment  Left Breast: Medium, Large  Left Nipple: Everted, Intact  Right Breast: Medium, Large  Right Nipple: Everted, Intact  Breast- Feeding Assessment  Attends Breast-Feeding Classes: No  Breast-Feeding Experience: No  Breast Trauma/Surgery: No  Type/Quality: Good (Per mother)

## 2022-11-21 LAB
ERYTHROCYTE [DISTWIDTH] IN BLOOD BY AUTOMATED COUNT: 17.2 % (ref 11.5–14.5)
HCT VFR BLD AUTO: 29.9 % (ref 35–47)
HGB BLD-MCNC: 9.6 G/DL (ref 11.5–16)
MCH RBC QN AUTO: 26.7 PG (ref 26–34)
MCHC RBC AUTO-ENTMCNC: 32.1 G/DL (ref 30–36.5)
MCV RBC AUTO: 83.3 FL (ref 80–99)
NRBC # BLD: 0 K/UL (ref 0–0.01)
NRBC BLD-RTO: 0 PER 100 WBC
PLATELET # BLD AUTO: 193 K/UL (ref 150–400)
PMV BLD AUTO: 9.7 FL (ref 8.9–12.9)
RBC # BLD AUTO: 3.59 M/UL (ref 3.8–5.2)
WBC # BLD AUTO: 13.1 K/UL (ref 3.6–11)

## 2022-11-21 PROCEDURE — 74011250636 HC RX REV CODE- 250/636: Performed by: OBSTETRICS & GYNECOLOGY

## 2022-11-21 PROCEDURE — 36415 COLL VENOUS BLD VENIPUNCTURE: CPT

## 2022-11-21 PROCEDURE — 85027 COMPLETE CBC AUTOMATED: CPT

## 2022-11-21 PROCEDURE — 74011250637 HC RX REV CODE- 250/637: Performed by: OBSTETRICS & GYNECOLOGY

## 2022-11-21 PROCEDURE — 65270000029 HC RM PRIVATE

## 2022-11-21 RX ADMIN — IBUPROFEN 800 MG: 800 TABLET, FILM COATED ORAL at 11:58

## 2022-11-21 RX ADMIN — DOCUSATE SODIUM 100 MG: 100 CAPSULE, LIQUID FILLED ORAL at 20:26

## 2022-11-21 RX ADMIN — KETOROLAC TROMETHAMINE 30 MG: 30 INJECTION, SOLUTION INTRAMUSCULAR at 05:13

## 2022-11-21 RX ADMIN — SIMETHICONE 80 MG: 80 TABLET, CHEWABLE ORAL at 09:40

## 2022-11-21 RX ADMIN — DOCUSATE SODIUM 100 MG: 100 CAPSULE, LIQUID FILLED ORAL at 09:40

## 2022-11-21 RX ADMIN — IBUPROFEN 800 MG: 800 TABLET, FILM COATED ORAL at 20:26

## 2022-11-21 NOTE — LACTATION NOTE
This note was copied from a baby's chart. Infant nursing well per mother. This is her first baby to breast feed and she will be here another day. Infant sleepy at the moment, hand expression taught and encouraged with feedings but especially with non feedings. Feeding on demand discussed. Patient to finger feed colostrum to infant at this time. Breastfeeding booklet provided. Patient to call for further lactation help if needed. Pt will successfully establish breastfeeding by feeding in response to early feeding cues   or wake every 3h, will obtain deep latch, and will keep log of feedings/output. Taught to BF at hunger cues and or q 2-3 hrs and to offer 10-20 drops of hand expressed colostrum at any non-feeds. Breast Assessment  Left Breast: Medium, Large  Left Nipple: Everted, Intact  Right Breast: Medium, Large  Right Nipple: Everted, Intact  Breast- Feeding Assessment  Attends Breast-Feeding Classes: No  Breast-Feeding Experience: No  Breast Trauma/Surgery: No  Type/Quality: Good (per mother)  Lactation Consultant Visits  Breast-Feedings: Good  (infant sleepy)  Mother/Infant Observation  Infant Observation: Frenulum checked  LATCH Documentation  Latch: Too sleepy or reluctant, no latch achieved  Audible Swallowing: None  Type of Nipple: Everted (after stimulation)  Comfort (Breast/Nipple): Soft/non-tender  Hold (Positioning): Full assist, teach one side, mother does other, staff holds  LATCH Score: 5    Reviewed breastfeeding basics:  How milk is made and normal  breastfeeding behaviors discussed. Supply and demand,  stomach size, early feeding cues, skin to skin bonding with comfortable positioning and baby led latch-on reviewed. How to identify signs of successful breastfeeding sessions reviewed; education on asymetrical latch, signs of effective latching vs shallow, in-effective latching, normal  feeding frequency and duration and expected infant output discussed.   Normal course of breastfeeding discussed including the AAP's recommendation that children receive exclusive breast milk feedings for the first six months of life with breast milk feedings to continue through the first year of life and/or beyond as complimentary table foods are added. Breastfeeding Booklet and Warm line information provided with discussion. Discussed typical  weight loss and the importance of pediatrician appointment within 24-48 hours of discharge, at 2 weeks of life and normalcy of requesting pediatric weight checks as needed in between visits. Hand Expression Education:  Mom taught how to manually hand express her colostrum. Emphasized the importance of providing infant with valuable colostrum as infant rests skin to skin at breast.  Aware to avoid extended periods of non-feeding. Aware to offer 10-20+ drops of colostrum every 2-3 hours until infant is latching and nursing effectively. Taught the rationale behind this low tech but highly effective evidence based practice.

## 2022-11-21 NOTE — PROGRESS NOTES
PostPartum Note    Alexis Romero  [id number]  1995  33 y.o.    S:  Ms. Alexis Romero is a 33 y.o.  POD #1 s/p LTCS @ 38w1d. Doing well. She had a baby boy. Her lochia is like a period. She describes her pain as mild and is well controlled with PO medications. She is breast feeding and this is going well. She is ambulating and voiding. Tolerating PO intake. O:   Visit Vitals  BP (!) 92/53   Pulse 87   Temp 98.1 °F (36.7 °C)   Resp 16   Ht 5' 3\" (1.6 m)   Wt 103 kg (227 lb)   SpO2 97%   Breastfeeding Unknown   BMI 40.21 kg/m²       Lab Results   Component Value Date/Time    WBC 13.1 (H) 2022 03:55 AM    HGB 9.6 (L) 2022 03:55 AM    HCT 29.9 (L) 2022 03:55 AM    PLATELET 101  03:55 AM    MCV 83.3 2022 03:55 AM       Gen - No acute distress  Abdomen - Fundus firm, below the umbilicus, incision clean/dry/intact  Ext - Warm, well perfused. Nontender    A/P:  POD #1 s/p LTCS @ 38w1d doing well. 1.  Routine PP instructions/ care discussed  2. Blood type - Rh positive  3..  Circumcision per nursing to be deferred until tomorrow   4. Discharge POD2 or 3   5. F/U 4-6 weeks for PP check.       Estiven Monroy MD  [facility]

## 2022-11-21 NOTE — ROUTINE PROCESS
Bedside and Verbal shift change report given to Enma Ford RN  (oncoming nurse) by Viola Brown RN  (offgoing nurse). Report included the following information SBAR, Kardex, Intake/Output, MAR, and Recent Results.

## 2022-11-21 NOTE — OP NOTES
[facility]  OPERATIVE REPORT    Name:  Hipolito Holden  MR#:  [id number]  :  1995  ACCOUNT #:  [de-identified]  DATE OF SERVICE:  2022    PREOPERATIVE DIAGNOSES:  1. Intrauterine pregnancy at 38 weeks. 2.  BMI 45.  3.  Non-reassuring fetal surveillance. 4.  Stage I arrest.    POSTOPERATIVE DIAGNOSES:  1. Intrauterine pregnancy at 38 weeks. 2.  BMI 45.  3.  Non-reassuring fetal surveillance. 4.  Stage I arrest, delivered. PROCEDURE PERFORMED:  Primary low transverse  section. SURGEON:  Jenna Zhao MD    ASSISTANT:  Helena Driver RN    ANESTHESIA:  Epidural.    COMPLICATIONS:  None. SPECIMENS REMOVED:  None. IMPLANTS:  None. QUANTITATIVE BLOOD LOSS:  610 mL. IV FLUIDS:  1200 mL, crystalloid. URINE OUTPUT:  100 mL, clear. DRAINS:  Li to gravity. INTRAOPERATIVE FINDINGS:  Viable male infant with Apgars of 8 and 9, weight 2915 grams. Normal ovary, uterus and tubes. INDICATIONS:  Ms. Capo Akins is a 55-year-old G1, P0 with an intrauterine pregnancy at 45 weeks, who was admitted to Labor and Delivery after induction of labor given nonreassuring fetal surveillance in the office with a BPP of 4/8. Surveillance was performed due to her BMI. She presented to Labor and Delivery and underwent a balloon and prostaglandin induction of labor followed by Pitocin and amniotomy. She received an epidural for pain and seven doses of penicillin prior to delivery for GBS positive status. Her cervix remained unchanged after 18 hours following amniotomy and the decision was made to proceed with  delivery. PROCEDURE:  The patient was taken to the operating room where her epidural anesthesia was dosed to a surgical level. She was prepped and draped in normal sterile fashion in dorsal supine position with a leftward tilt. .  A Pfannenstiel skin incision was made two fingerbreadths above the pubic symphysis and carried down to the underlying layer of fascia. The fascia was incised at the midline and the incision was extended bilaterally. The superior aspect of fascial incision was grasped with Kocher clamps and elevated and the underlying rectus muscles were dissected off both bluntly and sharply. The same was done to the inferior aspect of fascial incision. The rectus muscles were  in the midline and the abdominal cavity was entered sharply with excellent visualization of underlying structures in the bowel, bladder and uterus. The incision was extended with gentle stretching and the Metzenbaum scissors. A bladder blade was inserted and the vesicouterine peritoneum was identified, entered sharply and a bladder flap was created manually. The bladder blade was readjusted. A low transverse incision was made on the uterus with clear fluid at amniotomy. A viable male infant was delivered from vertex presentation without difficulty. His cord was clamped and cut and he was handed off to the waiting pediatric team after a 30-second cord clamp delay. The placenta was delivered with manual assistance and the uterus exteriorized and cleared of clots and debris. The uterine incision repaired with 0 Vicryl in a running locked fashion. Two additional figure-of-eight sutures were placed at the mid and left hand side to ensure hemostasis. The uterus was returned to the abdomen and the gutters were cleared of clots and debris. The hysterotomy was reinspected and noted to be hemostatic. The peritoneum and rectus muscles were reapproximated in a mass fashion with 2-0 Vicryl and in a running fashion. The superior and inferior fascial planes were inspected and noted to be hemostatic. The fascia was reapproximated with 0 Vicryl in a running fashion. The subcutaneous tissue was irrigated, noted to be hemostatic and reapproximated with 3-0 Vicryl in a running fashion.   The skin was closed with 4-0 Monocryl in a subcuticular fashion and Steri-Strips were placed. The patient tolerated the procedure well. All sponge, lap and needle counts were correct x2 and she was taken to recovery room in stable condition. She received Ancef and azithromycin IV prior to incision and SCDs upon entry into the operating room.       Deya Ayon MD      [id number]  D:  11/20/2022 10:01  T:  11/20/2022 18:22  JOB #:  [id number]

## 2022-11-22 PROCEDURE — 74011250637 HC RX REV CODE- 250/637: Performed by: OBSTETRICS & GYNECOLOGY

## 2022-11-22 PROCEDURE — 65270000029 HC RM PRIVATE

## 2022-11-22 PROCEDURE — 2709999900 HC NON-CHARGEABLE SUPPLY

## 2022-11-22 RX ADMIN — IBUPROFEN 800 MG: 800 TABLET, FILM COATED ORAL at 17:31

## 2022-11-22 RX ADMIN — IBUPROFEN 800 MG: 800 TABLET, FILM COATED ORAL at 07:25

## 2022-11-22 RX ADMIN — DOCUSATE SODIUM 100 MG: 100 CAPSULE, LIQUID FILLED ORAL at 09:41

## 2022-11-22 NOTE — LACTATION NOTE
This note was copied from a baby's chart. Mother had several visitors in room at time of visit -  baby was getting a diaper change (bowel movement). Mother states baby has been latching on and breastfeeding well. Reviewed breastfeeding basics:  Supply and demand,  stomach size, early  Feeding cues, skin to skin, positioning and baby led latch-on, assymetrical latch with signs of good, deep latch vs shallow, feeding frequency and duration, and log sheet for tracking infant feedings and output. Breastfeeding Booklet and Warm line information given. Discussed typical  weight loss and the importance of infant weight checks with pediatrician 1-2 post discharge. Care for sore/tender nipples discussed:  ways to improve positioning and latch practiced and discussed, hand express colostrum after feedings and let air dry, light application of lanolin, hydrogel pads, seek comfortable laid back feeding position, start feedings on least sore side first.     Mother will successfully establish breastfeeding by feeding in response to early feeding cues   or wake every 3h, will obtain deep latch, and will keep log of feedings/output. Taught to BF at hunger cues and or q 2-3 hrs and to offer 10-20 drops of hand expressed colostrum at any non-feeds. Breast Assessment  Left Breast: Medium, Large  Left Nipple: Everted, Intact  Right Breast: Medium, Large  Right Nipple: Everted, Intact  Breast- Feeding Assessment  Attends Breast-Feeding Classes: No  Breast-Feeding Experience: No  Breast Trauma/Surgery: No  Type/Quality: Good (Per mother)  Lactation Consultant Visits  Breast-Feedings:  (Mother states baby last breast fed at 12:00 and nursed well for 10 minutes.)    LC encouraged mother to call for breastfeeding assistance.

## 2022-11-22 NOTE — PROGRESS NOTES
1900- Bedside shift change report given to GERMAINE Carvalho RN (oncoming nurse) by Fina White RNC (offgoing nurse). Report included the following information SBAR, Intake/Output, MAR, and Recent Results.

## 2022-11-22 NOTE — PROGRESS NOTES
1900- Bedside shift change report given to MADELYN Harris RN (oncoming nurse) by Vargas Allen RNC (offgoing nurse). Report included the following information SBAR, Intake/Output, MAR, and Recent Results.

## 2022-11-22 NOTE — PROGRESS NOTES
Post-Operative  Day 2    Kathleen Griffiths         Information for the patient's :  Vasquez Lima, Male Woody Ann [[id number]]   , Low Transverse  Patient doing well without unusual complaints. Patient notes  good relief  with current pain medications. Patient reports normal lochia. She is currently tolerating general diet without nausea or vomiting. She reports flatus yes. Vitals:  Visit Vitals  /62   Pulse 66   Temp 98.1 °F (36.7 °C)   Resp 16   Ht 5' 3\" (1.6 m)   Wt 103 kg (227 lb)   SpO2 97%   Breastfeeding Unknown   BMI 40.21 kg/m²     Temp (24hrs), Av.6 °F (37 °C), Min:98.1 °F (36.7 °C), Max:99.3 °F (37.4 °C)      Last 24hr Input/Output:    Intake/Output Summary (Last 24 hours) at 2022 0835  Last data filed at 2022 1200  Gross per 24 hour   Intake --   Output 700 ml   Net -700 ml          Exam:   Gen: Patient without distress. CV: RRR  Chest: CTA  Abdomen:soft, expected mild tenderness, fundus firm @U-2, incision closed with suture  Lower extremities are no tenderness with absent   edema. Labs:   Lab Results   Component Value Date/Time    WBC 13.1 (H) 2022 03:55 AM    WBC 7.9 2022 01:10 AM    HGB 9.6 (L) 2022 03:55 AM    HGB 10.6 (L) 2022 01:10 AM    HCT 29.9 (L) 2022 03:55 AM    HCT 33.0 (L) 2022 01:10 AM    PLATELET 894  03:55 AM    PLATELET 615 3514 01:10 AM       No results found for this or any previous visit (from the past 24 hour(s)).   Lab Results   Component Value Date/Time    Rubella, External Immune 2022 12:00 AM    GrBStrep, External positive 2022 12:00 AM    HBsAg, External Negative 2022 12:00 AM    HIV, External Negative 2022 12:00 AM    RPR, External Non-reactive 2022 12:00 AM    Gonorrhea, External Negative 2022 12:00 AM    Chlamydia, External Negative 2022 12:00 AM         Assessment:   POD 2 s/p 1LTCS for non-reassuring fetal surveillance, failure to progress. Doing well and stable  Plan:  1. Continue routine post operative care. 2. Advance diet as tolerated, ambulate, incentive spirometry  3. Medical conditions: Obesity BMI 45   4.  Baby boy: circ deferred this AM because baby is to have [address] for undescended teste     Monse Riddles, DO  11/22/2022  8:35 AM

## 2022-11-23 VITALS
WEIGHT: 227 LBS | HEART RATE: 70 BPM | OXYGEN SATURATION: 98 % | HEIGHT: 63 IN | TEMPERATURE: 98.1 F | RESPIRATION RATE: 16 BRPM | SYSTOLIC BLOOD PRESSURE: 132 MMHG | BODY MASS INDEX: 40.22 KG/M2 | DIASTOLIC BLOOD PRESSURE: 87 MMHG

## 2022-11-23 PROCEDURE — 74011250637 HC RX REV CODE- 250/637: Performed by: OBSTETRICS & GYNECOLOGY

## 2022-11-23 RX ORDER — LANOLIN ALCOHOL/MO/W.PET/CERES
325 CREAM (GRAM) TOPICAL
Qty: 30 TABLET | Refills: 0 | Status: SHIPPED | OUTPATIENT
Start: 2022-11-23

## 2022-11-23 RX ORDER — HYDROCODONE BITARTRATE AND ACETAMINOPHEN 5; 325 MG/1; MG/1
1 TABLET ORAL
Qty: 12 TABLET | Refills: 0 | Status: SHIPPED | OUTPATIENT
Start: 2022-11-23 | End: 2022-11-26

## 2022-11-23 RX ORDER — LANOLIN ALCOHOL/MO/W.PET/CERES
1 CREAM (GRAM) TOPICAL
Status: DISCONTINUED | OUTPATIENT
Start: 2022-11-23 | End: 2022-11-23 | Stop reason: HOSPADM

## 2022-11-23 RX ORDER — DOCUSATE SODIUM 100 MG/1
100 CAPSULE, LIQUID FILLED ORAL 2 TIMES DAILY
Qty: 60 CAPSULE | Refills: 2 | Status: SHIPPED | OUTPATIENT
Start: 2022-11-23 | End: 2023-02-21

## 2022-11-23 RX ORDER — IBUPROFEN 800 MG/1
800 TABLET ORAL
Qty: 30 TABLET | Refills: 0 | Status: SHIPPED | OUTPATIENT
Start: 2022-11-23

## 2022-11-23 RX ORDER — ACETAMINOPHEN 325 MG/1
650 TABLET ORAL
Qty: 30 TABLET | Refills: 0 | Status: SHIPPED | OUTPATIENT
Start: 2022-11-23

## 2022-11-23 RX ADMIN — IBUPROFEN 800 MG: 800 TABLET, FILM COATED ORAL at 08:36

## 2022-11-23 RX ADMIN — IBUPROFEN 800 MG: 800 TABLET, FILM COATED ORAL at 01:31

## 2022-11-23 RX ADMIN — MUPIROCIN: 20 OINTMENT TOPICAL at 08:37

## 2022-11-23 RX ADMIN — DOCUSATE SODIUM 100 MG: 100 CAPSULE, LIQUID FILLED ORAL at 08:36

## 2022-11-23 NOTE — PROGRESS NOTES
PostPartum Note    Lyndsey Coy  [id number]  1995  33 y.o.    S:  Ms. Lyndsey Coy is a 33 y.o.  POD #3 s/p primary  @ 38w1d. Doing well. She had a baby boy. Her lochia is like a period. She describes her pain as mild and is well controlled with PO medications. She is breast feeding and this is going well. She is ambulating and voiding. Tolerating PO intake. O:   Visit Vitals  /87 (BP Patient Position: At rest)   Pulse 70   Temp 98.1 °F (36.7 °C)   Resp 16   Ht 5' 3\" (1.6 m)   Wt 103 kg (227 lb)   SpO2 98%   Breastfeeding Unknown   BMI 40.21 kg/m²       Gen - No acute distress  Respirations - nonlabored   Abdomen - Fundus firm below the umbilicus   Ext - Warm, well perfused, nontender     Lab Results   Component Value Date/Time    WBC 13.1 (H) 2022 03:55 AM    HGB 9.6 (L) 2022 03:55 AM    HCT 29.9 (L) 2022 03:55 AM    PLATELET 226  03:55 AM    MCV 83.3 2022 03:55 AM         A/P:  POD #3 s/p primary  for NRFHT, FTP @ 38w1d doing well. 1.  Routine PP instructions/ care discussed  2. Blood type - Rh pos  3. Rubella immune  4. Circumcision desired will do today   5. Discharge today    6. Uncomplicated other than obese bmi 45, discussed wound care   6. F/U 4-6 weeks for PP check.       Reyna Veliz MD  [facility]

## 2022-11-23 NOTE — PROGRESS NOTES
10:35 AM Patient discharged to home. Discharge instructions and education completed and patient reported she had no more questions. Bands verified on patient and infant, see footprint sheet. Infant placed in car seat by parent. Prescriptions:   Sent to patient's preferred pharmacy.

## (undated) DEVICE — BLADE ASSEMB CLP HAIR FINE --

## (undated) DEVICE — STRIP,CLOSURE,WOUND,MEDI-STRIP,1/2X4: Brand: MEDLINE

## (undated) DEVICE — TOWEL,OR,DSP,ST,BLUE,STD,2/PK,40PK/CS: Brand: MEDLINE

## (undated) DEVICE — 3M™ MEDIPORE™ H SOFT CLOTH SURGICAL TAPE, 2863, 3 IN X 10 YD, 12/CASE: Brand: 3M™ MEDIPORE™

## (undated) DEVICE — BARRIER TISS ADH ABSRB 3X4IN -- GYNECARE INTERCEED

## (undated) DEVICE — 3000CC GUARDIAN II: Brand: GUARDIAN

## (undated) DEVICE — SUTURE VCRL 3-0 L36IN ABSRB UD CT L40MM 1/2 CIR TAPERPOINT J956H

## (undated) DEVICE — SUT VCRL 2-0 36IN CT1 UD --

## (undated) DEVICE — ROCKER SWITCH PENCIL HOLSTER: Brand: VALLEYLAB

## (undated) DEVICE — SOLUTION IV 1000ML 0.9% SOD CHL

## (undated) DEVICE — SUTURE VCRL SZ 0 L36IN ABSRB VLT L40MM CT 1/2 CIR J358H

## (undated) DEVICE — SPONGE: LAP 18X18 W  200/CS: Brand: MEDICAL ACTION INDUSTRIES

## (undated) DEVICE — TRAY,URINE METER,100% SILICONE,16FR10ML: Brand: MEDLINE

## (undated) DEVICE — C-SECTION II-LF: Brand: MEDLINE INDUSTRIES, INC.

## (undated) DEVICE — SUTURE MCRYL SZ 4 0 L18IN ABSRB VLT PS 1 L24MM 3 8 CIR REV Y682H

## (undated) DEVICE — REM POLYHESIVE ADULT PATIENT RETURN ELECTRODE: Brand: VALLEYLAB

## (undated) DEVICE — GLOVE SURG SZ 65 THK91MIL LTX FREE SYN POLYISOPRENE

## (undated) DEVICE — TIP CLEANER: Brand: VALLEYLAB

## (undated) DEVICE — (D)PREP SKN CHLRAPRP APPL 26ML -- CONVERT TO ITEM 371833

## (undated) DEVICE — SYRINGE IRRIG 60ML SFT PLIABLE BLB EZ TO GRP 1 HND USE W/

## (undated) DEVICE — GARMENT,MEDLINE,DVT,INT,CALF,MED, GEN2: Brand: MEDLINE

## (undated) DEVICE — SOLIDIFIER FLUID 3000 CC ABSORB

## (undated) DEVICE — STERILE POLYISOPRENE POWDER-FREE SURGICAL GLOVES: Brand: PROTEXIS

## (undated) DEVICE — TELFA ADHESIVE ISLAND DRESSING: Brand: TELFA

## (undated) DEVICE — HANDLE LT SNAP ON ULT DURABLE LENS FOR TRUMPF ALC DISPOSABLE

## (undated) DEVICE — GLOVE SURG SZ 8 L12IN FNGR THK79MIL GRN LTX FREE